# Patient Record
Sex: MALE | Race: WHITE | NOT HISPANIC OR LATINO | URBAN - METROPOLITAN AREA
[De-identification: names, ages, dates, MRNs, and addresses within clinical notes are randomized per-mention and may not be internally consistent; named-entity substitution may affect disease eponyms.]

---

## 2020-06-10 ENCOUNTER — OFFICE VISIT (OUTPATIENT)
Dept: URBAN - METROPOLITAN AREA TELEHEALTH 2 | Facility: TELEHEALTH | Age: 55
End: 2020-06-10
Payer: COMMERCIAL

## 2020-06-10 DIAGNOSIS — K58.9 IBS (IRRITABLE BOWEL SYNDROME) DIARRHEA PREDOMINANT: ICD-10-CM

## 2020-06-10 PROCEDURE — 3017F COLORECTAL CA SCREEN DOC REV: CPT | Performed by: INTERNAL MEDICINE

## 2020-06-10 PROCEDURE — 99213 OFFICE O/P EST LOW 20 MIN: CPT | Performed by: INTERNAL MEDICINE

## 2020-06-10 PROCEDURE — G8427 DOCREV CUR MEDS BY ELIG CLIN: HCPCS | Performed by: INTERNAL MEDICINE

## 2020-06-10 PROCEDURE — 1036F TOBACCO NON-USER: CPT | Performed by: INTERNAL MEDICINE

## 2020-06-10 RX ORDER — ONDANSETRON HYDROCHLORIDE 4 MG/1
TABLET, FILM COATED ORAL
Qty: 0 | Refills: 0 | Status: ACTIVE | COMMUNITY
Start: 1900-01-01

## 2020-06-10 RX ORDER — DIPHENOXYLATE HCL/ATROPINE 2.5-.025MG
TABLET ORAL
Qty: 0 | Refills: 0 | Status: ACTIVE | COMMUNITY
Start: 1900-01-01

## 2020-06-10 RX ORDER — LORATADINE 10 MG/1
CAPSULE, LIQUID FILLED ORAL
Qty: 0 | Refills: 0 | Status: ACTIVE | COMMUNITY
Start: 1900-01-01

## 2020-06-10 NOTE — HPI-OTHER HISTORIES
Pt seen for follow up last on 5/6/20 for diarrhea. Suspected to have IBS-D vs dumping sydn. Celiac panel neg. Infectious stool studies neg. Fecal fat screen neg. Diarrhea has nearly resolved with rifaximin. No new symptoms.  Of note, cholecystectomy. Partial gastrectomy 1980s. Colonoscopy 9/14 normal

## 2021-01-07 ENCOUNTER — OFFICE VISIT (OUTPATIENT)
Dept: RURAL CLINIC 4 | Facility: CLINIC | Age: 56
End: 2021-01-07

## 2021-02-01 ENCOUNTER — OFFICE VISIT (OUTPATIENT)
Dept: RURAL CLINIC 4 | Facility: CLINIC | Age: 56
End: 2021-02-01

## 2021-02-01 RX ORDER — ONDANSETRON HYDROCHLORIDE 4 MG/1
TABLET, FILM COATED ORAL
Qty: 0 | Refills: 0 | COMMUNITY
Start: 1900-01-01

## 2021-02-01 RX ORDER — LORATADINE 10 MG/1
CAPSULE, LIQUID FILLED ORAL
Qty: 0 | Refills: 0 | COMMUNITY
Start: 1900-01-01

## 2021-02-01 RX ORDER — DIPHENOXYLATE HCL/ATROPINE 2.5-.025MG
TABLET ORAL
Qty: 0 | Refills: 0 | COMMUNITY
Start: 1900-01-01

## 2021-02-01 NOTE — HPI-OTHER HISTORIES
June 2020:  Pt seen for follow up last on 5/6/20 for diarrhea. Suspected to have IBS-D vs dumping sydn. Celiac panel neg. Infectious stool studies neg. Fecal fat screen neg. Diarrhea has nearly resolved with rifaximin. No new symptoms.  Of note, cholecystectomy. Partial gastrectomy 1980s. Colonoscopy 9/14 normal   June 2018:    Upper abdominal pain. Loose stools. daily. more than 5 times per day. Gb removed 2011. No recent EGD.    JUNE 2018 - Last seen in Nov 2014 for chronic diarrhea, felt to be from IBS as well as dumping syndrome due to partial gastrectomy ( 1980s). Colonoscopy Sept 2014: normal, repeat advised in 10 years. Path: did not reveal any inflammation in TI or colon. Stool tests 2014: normal.  SUMMARY OF PREVIOUS VISIT NOV 2014 -  Colonoscopy Sep 2014- normal, repeat recommended in 10 years   Path- normal TI and colon biopsy.

## 2021-03-10 ENCOUNTER — OFFICE VISIT (OUTPATIENT)
Dept: RURAL CLINIC 8 | Facility: CLINIC | Age: 56
End: 2021-03-10

## 2021-03-10 RX ORDER — DIPHENOXYLATE HCL/ATROPINE 2.5-.025MG
TABLET ORAL
Qty: 0 | Refills: 0 | COMMUNITY
Start: 1900-01-01

## 2021-03-10 RX ORDER — ONDANSETRON HYDROCHLORIDE 4 MG/1
TABLET, FILM COATED ORAL
Qty: 0 | Refills: 0 | COMMUNITY
Start: 1900-01-01

## 2021-03-10 RX ORDER — LORATADINE 10 MG/1
CAPSULE, LIQUID FILLED ORAL
Qty: 0 | Refills: 0 | COMMUNITY
Start: 1900-01-01

## 2021-08-10 ENCOUNTER — OFFICE VISIT (OUTPATIENT)
Dept: RURAL CLINIC 4 | Facility: CLINIC | Age: 56
End: 2021-08-10

## 2021-08-10 RX ORDER — ONDANSETRON HYDROCHLORIDE 4 MG/1
TABLET, FILM COATED ORAL
Qty: 0 | Refills: 0 | COMMUNITY
Start: 1900-01-01

## 2021-08-10 RX ORDER — DIPHENOXYLATE HCL/ATROPINE 2.5-.025MG
TABLET ORAL
Qty: 0 | Refills: 0 | COMMUNITY
Start: 1900-01-01

## 2021-08-10 RX ORDER — LORATADINE 10 MG/1
CAPSULE, LIQUID FILLED ORAL
Qty: 0 | Refills: 0 | COMMUNITY
Start: 1900-01-01

## 2021-09-07 ENCOUNTER — OFFICE VISIT (OUTPATIENT)
Dept: RURAL CLINIC 4 | Facility: CLINIC | Age: 56
End: 2021-09-07

## 2021-09-07 RX ORDER — ONDANSETRON HYDROCHLORIDE 4 MG/1
TABLET, FILM COATED ORAL
Qty: 0 | Refills: 0 | COMMUNITY
Start: 1900-01-01

## 2021-09-07 RX ORDER — LORATADINE 10 MG/1
CAPSULE, LIQUID FILLED ORAL
Qty: 0 | Refills: 0 | COMMUNITY
Start: 1900-01-01

## 2021-09-07 RX ORDER — DIPHENOXYLATE HCL/ATROPINE 2.5-.025MG
TABLET ORAL
Qty: 0 | Refills: 0 | COMMUNITY
Start: 1900-01-01

## 2022-02-23 ENCOUNTER — OFFICE VISIT (OUTPATIENT)
Dept: RURAL CLINIC 8 | Facility: CLINIC | Age: 57
End: 2022-02-23

## 2022-02-23 RX ORDER — ONDANSETRON HYDROCHLORIDE 4 MG/1
TABLET, FILM COATED ORAL
Qty: 0 | Refills: 0 | COMMUNITY
Start: 1900-01-01

## 2022-02-23 RX ORDER — LORATADINE 10 MG/1
CAPSULE, LIQUID FILLED ORAL
Qty: 0 | Refills: 0 | COMMUNITY
Start: 1900-01-01

## 2022-02-23 RX ORDER — DIPHENOXYLATE HCL/ATROPINE 2.5-.025MG
TABLET ORAL
Qty: 0 | Refills: 0 | COMMUNITY
Start: 1900-01-01

## 2022-02-23 NOTE — HPI-TODAY'S VISIT:
02/23/2022:  In September 2014 Dr. pineda performed this patient's colonoscopy with random mucosal biopsies.  It was normal as were the biopsies.  Patient was having some issues with diarrhea back then.

## 2022-07-12 ENCOUNTER — OFFICE VISIT (OUTPATIENT)
Dept: RURAL CLINIC 4 | Facility: CLINIC | Age: 57
End: 2022-07-12

## 2022-07-12 RX ORDER — LORATADINE 10 MG/1
CAPSULE, LIQUID FILLED ORAL
Qty: 0 | Refills: 0 | COMMUNITY
Start: 1900-01-01

## 2022-07-12 RX ORDER — ONDANSETRON HYDROCHLORIDE 4 MG/1
TABLET, FILM COATED ORAL
Qty: 0 | Refills: 0 | COMMUNITY
Start: 1900-01-01

## 2022-07-12 RX ORDER — DIPHENOXYLATE HCL/ATROPINE 2.5-.025MG
TABLET ORAL
Qty: 0 | Refills: 0 | COMMUNITY
Start: 1900-01-01

## 2022-10-26 ENCOUNTER — OFFICE VISIT (OUTPATIENT)
Dept: RURAL CLINIC 8 | Facility: CLINIC | Age: 57
End: 2022-10-26

## 2022-10-26 RX ORDER — ONDANSETRON HYDROCHLORIDE 4 MG/1
TABLET, FILM COATED ORAL
Qty: 0 | Refills: 0 | Status: ACTIVE | COMMUNITY
Start: 1900-01-01

## 2022-10-26 RX ORDER — LORATADINE 10 MG/1
CAPSULE, LIQUID FILLED ORAL
Qty: 0 | Refills: 0 | Status: ACTIVE | COMMUNITY
Start: 1900-01-01

## 2022-10-26 RX ORDER — DIPHENOXYLATE HCL/ATROPINE 2.5-.025MG
TABLET ORAL
Qty: 0 | Refills: 0 | Status: ACTIVE | COMMUNITY
Start: 1900-01-01

## 2022-10-26 NOTE — HPI-TODAY'S VISIT:
10/26/2022:  In September 2014 Dr. pineda performed this patient's colonoscopy with random mucosal biopsies.  It was normal as were the biopsies.  Patient was having some issues with diarrhea back then.

## 2022-10-28 ENCOUNTER — OFFICE VISIT (OUTPATIENT)
Dept: RURAL CLINIC 8 | Facility: CLINIC | Age: 57
End: 2022-10-28

## 2023-02-08 ENCOUNTER — OFFICE VISIT (OUTPATIENT)
Dept: RURAL CLINIC 8 | Facility: CLINIC | Age: 58
End: 2023-02-08
Payer: COMMERCIAL

## 2023-02-08 VITALS
TEMPERATURE: 96.8 F | DIASTOLIC BLOOD PRESSURE: 90 MMHG | HEART RATE: 80 BPM | HEIGHT: 69 IN | WEIGHT: 145 LBS | BODY MASS INDEX: 21.48 KG/M2 | SYSTOLIC BLOOD PRESSURE: 146 MMHG

## 2023-02-08 DIAGNOSIS — R13.19 ESOPHAGEAL DYSPHAGIA: ICD-10-CM

## 2023-02-08 DIAGNOSIS — M54.9 DORSALGIA, UNSPECIFIED: ICD-10-CM

## 2023-02-08 DIAGNOSIS — R10.84 GENERALIZED ABDOMINAL PAIN: ICD-10-CM

## 2023-02-08 DIAGNOSIS — G89.29 OTHER CHRONIC PAIN: ICD-10-CM

## 2023-02-08 DIAGNOSIS — Z98.890 HISTORY OF GASTRIC SURGERY: ICD-10-CM

## 2023-02-08 DIAGNOSIS — R19.7 DIARRHEA, UNSPECIFIED TYPE: ICD-10-CM

## 2023-02-08 DIAGNOSIS — R15.9 FULL INCONTINENCE OF FECES: ICD-10-CM

## 2023-02-08 DIAGNOSIS — R15.2 FECAL URGENCY: ICD-10-CM

## 2023-02-08 DIAGNOSIS — Z87.11 HISTORY OF PEPTIC ULCER: ICD-10-CM

## 2023-02-08 PROBLEM — 142251000119102: Status: ACTIVE | Noted: 2023-02-08

## 2023-02-08 PROBLEM — 82423001: Status: ACTIVE | Noted: 2023-02-08

## 2023-02-08 PROCEDURE — 99214 OFFICE O/P EST MOD 30 MIN: CPT | Performed by: INTERNAL MEDICINE

## 2023-02-08 RX ORDER — SODIUM, POTASSIUM,MAG SULFATES 17.5-3.13G
177 ML SOLUTION, RECONSTITUTED, ORAL ORAL
Qty: 1 KIT | Refills: 0 | OUTPATIENT
Start: 2023-02-08 | End: 2023-02-09

## 2023-02-08 RX ORDER — LORATADINE 10 MG/1
CAPSULE, LIQUID FILLED ORAL
Qty: 0 | Refills: 0 | Status: ACTIVE | COMMUNITY
Start: 1900-01-01

## 2023-02-08 RX ORDER — DIPHENOXYLATE HCL/ATROPINE 2.5-.025MG
TABLET ORAL
Qty: 0 | Refills: 0 | Status: ACTIVE | COMMUNITY
Start: 1900-01-01

## 2023-02-08 RX ORDER — BISACODYL 5 MG
TAKE 4 TABLET, DELAYED RELEASE (ENTERIC COATED) ORAL
Qty: 4 | OUTPATIENT
Start: 2023-02-08 | End: 2023-02-09

## 2023-02-08 RX ORDER — ONDANSETRON HYDROCHLORIDE 4 MG/1
TABLET, FILM COATED ORAL
Qty: 0 | Refills: 0 | Status: ACTIVE | COMMUNITY
Start: 1900-01-01

## 2023-02-08 NOTE — HPI-TODAY'S VISIT:
-  02/08/2023: Patient last seen by Dr. Lao in 2020-nearly 3 years ago. "I keep getting lots of diarrhea". he says that he has diarrhea all the time.  he has up to 5 stools a day or more.  he has rare nocturnal stools.  has had several accidents. no blood in the stool.  - he he can have a solid stool but rarely. -

## 2023-02-08 NOTE — HPI-OTHER HISTORIES
June 2020:  Pt seen for follow up last on 5/6/20 for diarrhea. Suspected to have IBS-D vs dumping sydn. Celiac panel neg. Infectious stool studies neg. Fecal fat screen neg. Diarrhea has nearly resolved with rifaximin. No new symptoms.  Of note, cholecystectomy. Partial gastrectomy 1980s. Colonoscopy 9/14 normal   June 2018:    Upper abdominal pain. Loose stools. daily. more than 5 times per day. Gb removed 2011. No recent EGD.    JUNE 2018 - Last seen in Nov 2014 for chronic diarrhea, felt to be from IBS as well as dumping syndrome due to partial gastrectomy ( 1980s). Colonoscopy Sept 2014: normal, repeat advised in 10 years. Path: did not reveal any inflammation in TI or colon. Stool tests 2014: normal.  SUMMARY OF PREVIOUS VISIT NOV 2014 -  Colonoscopy Sep 2014- normal, repeat recommended in 10 years   Path- normal TI and colon biopsy. Instructions: This plan will send the code FBSD to the PM system.  DO NOT or CHANGE the price. Detail Level: Simple Price (Do Not Change): 0.00

## 2023-02-09 ENCOUNTER — TELEPHONE ENCOUNTER (OUTPATIENT)
Dept: URBAN - METROPOLITAN AREA CLINIC 92 | Facility: CLINIC | Age: 58
End: 2023-02-09

## 2023-02-22 ENCOUNTER — OFFICE VISIT (OUTPATIENT)
Dept: RURAL CLINIC 8 | Facility: CLINIC | Age: 58
End: 2023-02-22

## 2023-04-25 ENCOUNTER — OFFICE VISIT (OUTPATIENT)
Dept: RURAL MEDICAL CENTER 4 | Facility: MEDICAL CENTER | Age: 58
End: 2023-04-25

## 2023-06-20 ENCOUNTER — TELEPHONE ENCOUNTER (OUTPATIENT)
Dept: RURAL CLINIC 8 | Facility: CLINIC | Age: 58
End: 2023-06-20

## 2023-09-27 ENCOUNTER — OFFICE VISIT (OUTPATIENT)
Dept: RURAL MEDICAL CENTER 4 | Facility: MEDICAL CENTER | Age: 58
End: 2023-09-27

## 2023-12-13 ENCOUNTER — OFFICE VISIT (OUTPATIENT)
Dept: RURAL CLINIC 8 | Facility: CLINIC | Age: 58
End: 2023-12-13

## 2023-12-16 ENCOUNTER — OFFICE VISIT (OUTPATIENT)
Dept: RURAL CLINIC 8 | Facility: CLINIC | Age: 58
End: 2023-12-16

## 2023-12-16 RX ORDER — LORATADINE 10 MG/1
CAPSULE, LIQUID FILLED ORAL
Qty: 0 | Refills: 0 | COMMUNITY
Start: 1900-01-01

## 2023-12-16 RX ORDER — DIPHENOXYLATE HCL/ATROPINE 2.5-.025MG
TABLET ORAL
Qty: 0 | Refills: 0 | COMMUNITY
Start: 1900-01-01

## 2023-12-16 RX ORDER — ONDANSETRON HYDROCHLORIDE 4 MG/1
TABLET, FILM COATED ORAL
Qty: 0 | Refills: 0 | COMMUNITY
Start: 1900-01-01

## 2023-12-16 NOTE — HPI-TODAY'S VISIT:
-  02/08/2023: Patient last seen by Dr. Lao in 2020-nearly 3 years ago. "I keep getting lots of diarrhea". he says that he has diarrhea all the time.  he has up to 5 stools a day or more.  he has rare nocturnal stools.  has had several accidents. no blood in the stool.  - he he can have a solid stool but rarely. - -  12/16/2023:  I last saw this patient in February of 2023-10 months ago.  He was evaluated for abdominal pain and diarrhea.  I requested stool studies and planned EGD and colonoscopy.  Ten months later none of the 3 have yet occurred.

## 2024-04-11 ENCOUNTER — LAB (OUTPATIENT)
Dept: RURAL CLINIC 8 | Facility: CLINIC | Age: 59
End: 2024-04-11

## 2024-04-12 ENCOUNTER — OV EP (OUTPATIENT)
Dept: RURAL CLINIC 8 | Facility: CLINIC | Age: 59
End: 2024-04-12

## 2024-04-12 RX ORDER — ONDANSETRON HYDROCHLORIDE 4 MG/1
TABLET, FILM COATED ORAL
Qty: 0 | Refills: 0 | COMMUNITY
Start: 1900-01-01

## 2024-04-12 RX ORDER — LORATADINE 10 MG/1
CAPSULE, LIQUID FILLED ORAL
Qty: 0 | Refills: 0 | COMMUNITY
Start: 1900-01-01

## 2024-04-12 RX ORDER — DIPHENOXYLATE HCL/ATROPINE 2.5-.025MG
TABLET ORAL
Qty: 0 | Refills: 0 | COMMUNITY
Start: 1900-01-01

## 2024-04-12 NOTE — HPI-TODAY'S VISIT:
-  02/08/2023: Patient last seen by Dr. Lao in 2020-nearly 3 years ago. "I keep getting lots of diarrhea". he says that he has diarrhea all the time.  he has up to 5 stools a day or more.  he has rare nocturnal stools.  has had several accidents. no blood in the stool.  - he he can have a solid stool but rarely. - -  12/16/2023:  I last saw this patient in February of 2023-10 months ago.  He was evaluated for abdominal pain and diarrhea.  I requested stool studies and planned EGD and colonoscopy.  Ten months later none of the 3 have yet occurred. -  04/12/2024: High last saw this patient in February 2023-over a year ago.  He was evaluated in the office because of diarrhea.  I planned EGD, colonoscopy, and stool studies.  Over a year has passed and none of the 3 have yet been accomplished.  Before that I saw him 3 years ago in 2020.  EGD, colonoscopy, and stool studies were all ordered at that time as well and 4 years later they are still not accomplished.

## 2024-06-24 ENCOUNTER — DASHBOARD ENCOUNTERS (OUTPATIENT)
Age: 59
End: 2024-06-24

## 2024-06-27 ENCOUNTER — OFFICE VISIT (OUTPATIENT)
Dept: RURAL CLINIC 4 | Facility: CLINIC | Age: 59
End: 2024-06-27

## 2024-06-27 RX ORDER — DIPHENOXYLATE HCL/ATROPINE 2.5-.025MG
TABLET ORAL
Qty: 0 | Refills: 0 | COMMUNITY
Start: 1900-01-01

## 2024-06-27 RX ORDER — LORATADINE 10 MG/1
CAPSULE, LIQUID FILLED ORAL
Qty: 0 | Refills: 0 | COMMUNITY
Start: 1900-01-01

## 2024-06-27 RX ORDER — ONDANSETRON HYDROCHLORIDE 4 MG/1
TABLET, FILM COATED ORAL
Qty: 0 | Refills: 0 | COMMUNITY
Start: 1900-01-01

## 2024-08-08 ENCOUNTER — TELEPHONE ENCOUNTER (OUTPATIENT)
Dept: RURAL CLINIC 8 | Facility: CLINIC | Age: 59
End: 2024-08-08

## 2024-08-08 ENCOUNTER — OFFICE VISIT (OUTPATIENT)
Dept: RURAL CLINIC 4 | Facility: CLINIC | Age: 59
End: 2024-08-08
Payer: COMMERCIAL

## 2024-08-08 ENCOUNTER — LAB OUTSIDE AN ENCOUNTER (OUTPATIENT)
Dept: RURAL CLINIC 4 | Facility: CLINIC | Age: 59
End: 2024-08-08

## 2024-08-08 VITALS
WEIGHT: 141.8 LBS | TEMPERATURE: 97.7 F | HEART RATE: 84 BPM | BODY MASS INDEX: 21 KG/M2 | SYSTOLIC BLOOD PRESSURE: 142 MMHG | DIASTOLIC BLOOD PRESSURE: 94 MMHG | HEIGHT: 69 IN

## 2024-08-08 DIAGNOSIS — R19.7 CHRONIC DIARRHEA: ICD-10-CM

## 2024-08-08 DIAGNOSIS — Z87.11 PERSONAL HISTORY OF PEPTIC ULCER DISEASE: ICD-10-CM

## 2024-08-08 DIAGNOSIS — K21.9 GASTROESOPHAGEAL REFLUX DISEASE WITHOUT ESOPHAGITIS: ICD-10-CM

## 2024-08-08 DIAGNOSIS — R09.A2 GLOBUS SENSATION: ICD-10-CM

## 2024-08-08 PROBLEM — 266435005: Status: ACTIVE | Noted: 2024-08-08

## 2024-08-08 PROBLEM — 305058001: Status: ACTIVE | Noted: 2024-08-08

## 2024-08-08 PROBLEM — 267103008: Status: ACTIVE | Noted: 2024-08-08

## 2024-08-08 PROBLEM — 266998003: Status: ACTIVE | Noted: 2024-08-08

## 2024-08-08 PROCEDURE — 99244 OFF/OP CNSLTJ NEW/EST MOD 40: CPT | Performed by: NURSE PRACTITIONER

## 2024-08-08 PROCEDURE — 99214 OFFICE O/P EST MOD 30 MIN: CPT | Performed by: NURSE PRACTITIONER

## 2024-08-08 RX ORDER — LORATADINE 10 MG/1
CAPSULE, LIQUID FILLED ORAL
Qty: 0 | Refills: 0 | Status: ACTIVE | COMMUNITY
Start: 1900-01-01

## 2024-08-08 RX ORDER — ONDANSETRON HYDROCHLORIDE 4 MG/1
TABLET, FILM COATED ORAL
Qty: 0 | Refills: 0 | Status: ACTIVE | COMMUNITY
Start: 1900-01-01

## 2024-08-08 RX ORDER — DIPHENOXYLATE HCL/ATROPINE 2.5-.025MG
TABLET ORAL
Qty: 0 | Refills: 0 | Status: ACTIVE | COMMUNITY
Start: 1900-01-01

## 2024-08-08 NOTE — HPI-TODAY'S VISIT:
-  02/08/2023: Patient last seen by Dr. Lao in 2020-nearly 3 years ago. "I keep getting lots of diarrhea". he says that he has diarrhea all the time.  he has up to 5 stools a day or more.  he has rare nocturnal stools.  has had several accidents. no blood in the stool.  - he he can have a solid stool but rarely. - -  12/16/2023:  I last saw this patient in February of 2023-10 months ago.  He was evaluated for abdominal pain and diarrhea.  I requested stool studies and planned EGD and colonoscopy.  Ten months later none of the 3 have yet occurred. -  04/12/2024: High last saw this patient in February 2023-over a year ago.  He was evaluated in the office because of diarrhea.  I planned EGD, colonoscopy, and stool studies.  Over a year has passed and none of the 3 have yet been accomplished.  Before that I saw him 3 years ago in 2020.  EGD, colonoscopy, and stool studies were all ordered at that time as well and 4 years later they are still not accomplished.  08/08/2024 He is here on referral  from Sommer Graham NP for the evaluation for a colonoscopy. A copy to be sent to the referring provider. He was seen several times and scheduled for stool studies and bidirectional endoscopy and did not follow through. He said his bowel pattern is better. He still has intermittent episodes of chronic diarrhea. No rectal bleeding or abdominal pain. His previous colonoscopy was 10 yrs ago and negative for colon polyps.  He has intermittent GERD/HB  and occasional dysphagia with solid food.

## 2024-10-25 ENCOUNTER — OFFICE VISIT (OUTPATIENT)
Dept: RURAL MEDICAL CENTER 4 | Facility: MEDICAL CENTER | Age: 59
End: 2024-10-25

## 2024-12-02 ENCOUNTER — OFFICE VISIT (OUTPATIENT)
Dept: RURAL MEDICAL CENTER 2 | Facility: MEDICAL CENTER | Age: 59
End: 2024-12-02

## 2024-12-05 ENCOUNTER — TELEPHONE ENCOUNTER (OUTPATIENT)
Dept: RURAL CLINIC 4 | Facility: CLINIC | Age: 59
End: 2024-12-05

## 2025-01-07 ENCOUNTER — TELEPHONE ENCOUNTER (OUTPATIENT)
Dept: RURAL CLINIC 4 | Facility: CLINIC | Age: 60
End: 2025-01-07

## 2025-01-24 ENCOUNTER — OFFICE VISIT (OUTPATIENT)
Dept: RURAL MEDICAL CENTER 2 | Facility: MEDICAL CENTER | Age: 60
End: 2025-01-24

## 2025-01-24 ENCOUNTER — OFFICE VISIT (OUTPATIENT)
Dept: URBAN - METROPOLITAN AREA SURGERY CENTER 14 | Facility: SURGERY CENTER | Age: 60
End: 2025-01-24

## 2025-06-19 ENCOUNTER — OFFICE VISIT (OUTPATIENT)
Dept: RURAL CLINIC 4 | Facility: CLINIC | Age: 60
End: 2025-06-19
Payer: COMMERCIAL

## 2025-06-19 ENCOUNTER — TELEPHONE ENCOUNTER (OUTPATIENT)
Dept: RURAL CLINIC 8 | Facility: CLINIC | Age: 60
End: 2025-06-19

## 2025-06-19 ENCOUNTER — LAB OUTSIDE AN ENCOUNTER (OUTPATIENT)
Dept: RURAL CLINIC 4 | Facility: CLINIC | Age: 60
End: 2025-06-19

## 2025-06-19 DIAGNOSIS — Z87.11 PERSONAL HISTORY OF PEPTIC ULCER DISEASE: ICD-10-CM

## 2025-06-19 DIAGNOSIS — R09.A2 GLOBUS SENSATION: ICD-10-CM

## 2025-06-19 DIAGNOSIS — R19.7 CHRONIC DIARRHEA: ICD-10-CM

## 2025-06-19 DIAGNOSIS — K21.9 GASTROESOPHAGEAL REFLUX DISEASE WITHOUT ESOPHAGITIS: ICD-10-CM

## 2025-06-19 PROCEDURE — 99214 OFFICE O/P EST MOD 30 MIN: CPT | Performed by: NURSE PRACTITIONER

## 2025-06-19 RX ORDER — DIPHENOXYLATE HCL/ATROPINE 2.5-.025MG
TABLET ORAL
Qty: 0 | Refills: 0 | Status: DISCONTINUED | COMMUNITY
Start: 1900-01-01

## 2025-06-19 RX ORDER — ONDANSETRON HYDROCHLORIDE 4 MG/1
TABLET, FILM COATED ORAL
Qty: 0 | Refills: 0 | Status: DISCONTINUED | COMMUNITY
Start: 1900-01-01

## 2025-06-19 RX ORDER — LORATADINE 10 MG/1
CAPSULE, LIQUID FILLED ORAL
Qty: 0 | Refills: 0 | Status: DISCONTINUED | COMMUNITY
Start: 1900-01-01

## 2025-06-19 RX ORDER — SODIUM, POTASSIUM,MAG SULFATES 17.5-3.13G
177 ML SOLUTION, RECONSTITUTED, ORAL ORAL
Qty: 354 ML | Refills: 0 | OUTPATIENT
Start: 2025-06-19 | End: 2025-06-20

## 2025-06-19 NOTE — HPI-TODAY'S VISIT:
-  02/08/2023: Patient last seen by Dr. Lao in 2020-nearly 3 years ago. "I keep getting lots of diarrhea". he says that he has diarrhea all the time.  he has up to 5 stools a day or more.  he has rare nocturnal stools.  has had several accidents. no blood in the stool.  - he he can have a solid stool but rarely. - -  12/16/2023:  I last saw this patient in February of 2023-10 months ago.  He was evaluated for abdominal pain and diarrhea.  I requested stool studies and planned EGD and colonoscopy.  Ten months later none of the 3 have yet occurred. -  04/12/2024: High last saw this patient in February 2023-over a year ago.  He was evaluated in the office because of diarrhea.  I planned EGD, colonoscopy, and stool studies.  Over a year has passed and none of the 3 have yet been accomplished.  Before that I saw him 3 years ago in 2020.  EGD, colonoscopy, and stool studies were all ordered at that time as well and 4 years later they are still not accomplished.  08/08/2024 He is here on referral  from Sommer Graham NP for the evaluation for a colonoscopy. A copy to be sent to the referring provider. He was seen several times and scheduled for stool studies and bidirectional endoscopy and did not follow through. He said his bowel pattern is better. He still has intermittent episodes of chronic diarrhea. No rectal bleeding or abdominal pain. His previous colonoscopy was 10 yrs ago and negative for colon polyps.  He has intermittent GERD/HB  and occasional dysphagia with solid food.  06/19/2025: The patient is being seen today for intermittent episodes of diarrhea, ongoing for the past 3 to 4 years and has been seen for this same issue on several occasions and scheduled for procedures however did not follow through stating probelems with travel arrangements. Overall his episodes of diarrhea have decreased considerably.  No complaints of nocturnal stools, no rectal bleeding or abdominal pain.  His last colonoscopy was completed over 10 years ago and negative for colon polyps.  He continues on a PPI for GERD and has intermittent breakthrough GERD and heartburn as well as mild dysphagia with solid food, no choking.

## 2025-08-14 ENCOUNTER — OFFICE VISIT (OUTPATIENT)
Dept: RURAL MEDICAL CENTER 4 | Facility: MEDICAL CENTER | Age: 60
End: 2025-08-14

## 2025-08-18 ENCOUNTER — OFFICE VISIT (OUTPATIENT)
Dept: URBAN - METROPOLITAN AREA SURGERY CENTER 14 | Facility: SURGERY CENTER | Age: 60
End: 2025-08-18

## 2025-08-25 ENCOUNTER — OFFICE VISIT (OUTPATIENT)
Dept: URBAN - METROPOLITAN AREA SURGERY CENTER 14 | Facility: SURGERY CENTER | Age: 60
End: 2025-08-25

## 2025-08-28 ENCOUNTER — OFFICE VISIT (OUTPATIENT)
Dept: RURAL CLINIC 4 | Facility: CLINIC | Age: 60
End: 2025-08-28